# Patient Record
Sex: FEMALE | Race: WHITE | NOT HISPANIC OR LATINO | Employment: STUDENT | URBAN - METROPOLITAN AREA
[De-identification: names, ages, dates, MRNs, and addresses within clinical notes are randomized per-mention and may not be internally consistent; named-entity substitution may affect disease eponyms.]

---
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
## 2023-01-05 ENCOUNTER — OFFICE VISIT (OUTPATIENT)
Dept: GYNECOLOGY | Facility: CLINIC | Age: 18
End: 2023-01-05

## 2023-01-05 VITALS
BODY MASS INDEX: 22.5 KG/M2 | HEIGHT: 66 IN | SYSTOLIC BLOOD PRESSURE: 112 MMHG | WEIGHT: 140 LBS | DIASTOLIC BLOOD PRESSURE: 60 MMHG

## 2023-01-05 DIAGNOSIS — Z30.09 GENERAL COUNSELING AND ADVICE ON FEMALE CONTRACEPTION: Primary | ICD-10-CM

## 2023-01-05 RX ORDER — AZELASTINE HYDROCHLORIDE 0.5 MG/ML
SOLUTION/ DROPS OPHTHALMIC
COMMUNITY
Start: 2022-12-29

## 2023-01-05 NOTE — PROGRESS NOTES
Assessment/Plan:     Contraception - reviewed several options such as a different oc, contraceptive patch or vaginal ring, discussed Depo Provera, Nexplanon and briefly IUD  She was given information to review and will call us with her decision  If she opts for another pill, would consider Cryselle  I explained how different pills have different side effects and she would not necessarily have problems with mood on a different pill  They had multiple questions and these were answered  She is aware to use condoms if she is sexually active  There are no diagnoses linked to this encounter  Subjective:     Patient ID: Cami Márquez is a 21 y o  female  Patient here with her mother to discuss birth control options  She was on Junel initially but had an increase in acne  I then switched her to Tri-Sprintec  She then started having more depression and anxiety and crying and she stopped this at the end of November  She is feeling much better and her menstrual cycles have been normal   She would like to discuss other options  She typically does not have an issue with acne and her skin cleared up once she stopped the Junel  Review of Systems   Constitutional: Negative  Gastrointestinal: Negative  Genitourinary: Negative            Objective:     Physical Exam

## 2023-01-13 DIAGNOSIS — Z30.09 GENERAL COUNSELING AND ADVICE ON FEMALE CONTRACEPTION: Primary | ICD-10-CM

## 2023-01-13 RX ORDER — ETONOGESTREL AND ETHINYL ESTRADIOL 11.7; 2.7 MG/1; MG/1
INSERT, EXTENDED RELEASE VAGINAL
Qty: 1 EACH | Refills: 5 | Status: SHIPPED | OUTPATIENT
Start: 2023-01-13 | End: 2023-05-16 | Stop reason: SDUPTHER

## 2023-05-16 DIAGNOSIS — Z30.09 GENERAL COUNSELING AND ADVICE ON FEMALE CONTRACEPTION: ICD-10-CM

## 2023-05-16 RX ORDER — ETONOGESTREL AND ETHINYL ESTRADIOL 11.7; 2.7 MG/1; MG/1
INSERT, EXTENDED RELEASE VAGINAL
Qty: 1 EACH | Refills: 2 | Status: SHIPPED | OUTPATIENT
Start: 2023-05-16

## 2023-05-16 NOTE — TELEPHONE ENCOUNTER
Patient called to have her Nuvarong Rx transferred to [facility] in [place] now that she is home from [facility]  Her appointment is scheduled for 7/13/23  Please forward escript

## 2023-05-26 ENCOUNTER — OFFICE VISIT (OUTPATIENT)
Dept: FAMILY MEDICINE CLINIC | Facility: CLINIC | Age: 18
End: 2023-05-26

## 2023-05-26 VITALS
HEART RATE: 82 BPM | OXYGEN SATURATION: 98 % | SYSTOLIC BLOOD PRESSURE: 128 MMHG | TEMPERATURE: 97.3 F | HEIGHT: 66 IN | BODY MASS INDEX: 24.17 KG/M2 | DIASTOLIC BLOOD PRESSURE: 72 MMHG | WEIGHT: 150.4 LBS

## 2023-05-26 DIAGNOSIS — F32.89 OTHER DEPRESSION: ICD-10-CM

## 2023-05-26 DIAGNOSIS — J30.81 ALLERGIC RHINITIS DUE TO ANIMAL HAIR AND DANDER: ICD-10-CM

## 2023-05-26 DIAGNOSIS — Z76.89 ENCOUNTER TO ESTABLISH CARE: Primary | ICD-10-CM

## 2023-05-26 PROBLEM — F32.A DEPRESSION: Status: ACTIVE | Noted: 2023-05-26

## 2023-05-26 RX ORDER — EPINEPHRINE 0.3 MG/.3ML
INJECTION SUBCUTANEOUS
COMMUNITY
Start: 2023-05-15

## 2023-05-26 RX ORDER — CEFDINIR 300 MG/1
300 CAPSULE ORAL 2 TIMES DAILY
COMMUNITY
Start: 2023-05-19

## 2023-05-26 RX ORDER — PREDNISONE 10 MG/1
TABLET ORAL
COMMUNITY
Start: 2023-05-15

## 2023-05-26 NOTE — ASSESSMENT & PLAN NOTE
- Currently following with an allergist with plans for allergy shots  - Continue Zyrtec 10 mg daily and Optivar ophthalmic solutions

## 2023-05-26 NOTE — ASSESSMENT & PLAN NOTE
- HIV and Hep C screening declined at this time  - Patient aware that pap smear screening will start at the age of 22  - Follow up in one year for annual physical or as needed

## 2023-05-26 NOTE — PROGRESS NOTES
Assessment/Plan:    Depression  - Stable on Prozac 10mg daily    Allergic rhinitis due to animal hair and dander  - Currently following with an allergist with plans for allergy shots  - Continue Zyrtec 10 mg daily and Optivar ophthalmic solutions    Encounter to establish care  - HIV and Hep C screening declined at this time  - Patient aware that pap smear screening will start at the age of 22  - Follow up in one year for annual physical or as needed       Return in about 1 year (around 5/26/2024) for Annual physical       There are no Patient Instructions on file for this visit  Diagnoses and all orders for this visit:    Encounter to establish care    Other depression    Allergic rhinitis due to animal hair and dander    Other orders  -     cefdinir (OMNICEF) 300 mg capsule; Take 300 mg by mouth 2 (two) times a day  -     EPINEPHrine (EPIPEN) 0 3 mg/0 3 mL SOAJ; USE IN OUTTER THIGH IN CASE OF AN EMERGENCY  DISP WHATEVER GENERIC IS COVERED  -     predniSONE 10 mg tablet; TAKE 5 TABLETS WITH ALLERGIC REACTION (Patient not taking: Reported on 5/26/2023)          Subjective:         HPI Merline Marking is a very pleasant 19year old female with a past medical history of depression and allergic rhinitis who presents today for an encounter to establish care  She is currently on antibiotics for an upper respiratory infection which is improving but otherwise she feels well and endorses no other complaints at this time  She is currently on Prozac 10mg daily for her depression which is well controlled  She also sees an allergist and will be starting allergy injections  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  She is sexually active and uses protection           Current Outpatient Medications on File Prior to Visit   Medication Sig Dispense Refill   • azelastine (OPTIVAR) 0 05 % ophthalmic solution PLACE 1 DROP INTO BOTH EYES EVERY 8 HOURS AS NEEDED     • cefdinir (OMNICEF) 300 mg capsule Take 300 mg by mouth 2 (two) times a day     • cetirizine (ZyrTEC) 10 mg tablet Take 10 mg by mouth daily     • EPINEPHrine (EPIPEN) 0 3 mg/0 3 mL SOAJ USE IN OUTTER THIGH IN CASE OF AN EMERGENCY  DISP WHATEVER GENERIC IS COVERED     • etonogestrel-ethinyl estradiol (EluRyng) 0 12-0 015 MG/24HR vaginal ring Insert vaginally and leave in place for 3 consecutive weeks, then remove for 1 week, then repeat 1 each 2   • FLUoxetine (PROzac) 10 MG tablet Take 10 mg by mouth daily     • predniSONE 10 mg tablet TAKE 5 TABLETS WITH ALLERGIC REACTION (Patient not taking: Reported on 5/26/2023)       No current facility-administered medications on file prior to visit  Past Medical History:   Diagnosis Date   • Depression      Past Surgical History:   Procedure Laterality Date   • TONSILLECTOMY  2012   • WISDOM TOOTH EXTRACTION  2019     Social History     Socioeconomic History   • Marital status: Single     Spouse name: None   • Number of children: None   • Years of education: None   • Highest education level: None   Occupational History   • None   Tobacco Use   • Smoking status: Never   • Smokeless tobacco: Never   Vaping Use   • Vaping Use: Never used   Substance and Sexual Activity   • Alcohol use:  Yes   • Drug use: Never   • Sexual activity: Never   Other Topics Concern   • None   Social History Narrative   • None     Social Determinants of Health     Financial Resource Strain: Not on file   Food Insecurity: Not on file   Transportation Needs: Not on file   Physical Activity: Not on file   Stress: Not on file   Social Connections: Not on file   Intimate Partner Violence: Not on file   Housing Stability: Not on file     Family History   Problem Relation Age of Onset   • Hyperlipidemia Father    • Hypertension Father    • Diabetes Maternal Grandmother    • Heart disease Maternal Grandmother    • Diabetes Paternal Grandmother    • Colon cancer Paternal Grandfather "        Review of Systems   Constitutional: Negative  HENT: Negative  Eyes: Negative  Respiratory: Positive for cough (Improving)  Cardiovascular: Negative  Gastrointestinal: Negative  Genitourinary: Negative  Musculoskeletal: Negative  Skin: Negative  Neurological: Negative  Psychiatric/Behavioral: Negative  Objective:    /72 (BP Location: Left arm, Patient Position: Sitting, Cuff Size: Standard)   Pulse 82   Temp (!) 97 3 °F (36 3 °C) (Temporal)   Ht 5' 6\" (1 676 m)   Wt 68 2 kg (150 lb 6 4 oz)   SpO2 98%   BMI 24 28 kg/m²     Physical Exam  Constitutional:       General: She is not in acute distress  Appearance: She is not ill-appearing  HENT:      Head: Normocephalic and atraumatic  Mouth/Throat:      Mouth: Mucous membranes are moist       Pharynx: No oropharyngeal exudate or posterior oropharyngeal erythema  Eyes:      General:         Right eye: No discharge  Left eye: No discharge  Extraocular Movements: Extraocular movements intact  Pupils: Pupils are equal, round, and reactive to light  Cardiovascular:      Rate and Rhythm: Normal rate  Pulmonary:      Effort: Pulmonary effort is normal  No respiratory distress  Breath sounds: No wheezing  Abdominal:      General: Bowel sounds are normal  There is no distension  Palpations: Abdomen is soft  Tenderness: There is no abdominal tenderness  Musculoskeletal:      Cervical back: Normal range of motion  Right lower leg: No edema  Left lower leg: No edema  Lymphadenopathy:      Cervical: No cervical adenopathy  Neurological:      General: No focal deficit present  Mental Status: She is alert  Motor: No weakness        Coordination: Coordination normal       Gait: Gait normal       Deep Tendon Reflexes: Reflexes normal    Psychiatric:         Mood and Affect: Mood normal          Behavior: Behavior normal          Christina Aleman, " MD  05/26/23  8:26 AM

## 2023-07-13 ENCOUNTER — ANNUAL EXAM (OUTPATIENT)
Dept: GYNECOLOGY | Facility: CLINIC | Age: 18
End: 2023-07-13

## 2023-07-13 VITALS
HEIGHT: 66 IN | SYSTOLIC BLOOD PRESSURE: 116 MMHG | BODY MASS INDEX: 25.55 KG/M2 | WEIGHT: 159 LBS | DIASTOLIC BLOOD PRESSURE: 72 MMHG

## 2023-07-13 DIAGNOSIS — Z30.09 GENERAL COUNSELING AND ADVICE ON FEMALE CONTRACEPTION: ICD-10-CM

## 2023-07-13 DIAGNOSIS — Z20.2 POSSIBLE EXPOSURE TO STD: ICD-10-CM

## 2023-07-13 DIAGNOSIS — Z01.419 ENCOUNTER FOR ANNUAL ROUTINE GYNECOLOGICAL EXAMINATION: Primary | ICD-10-CM

## 2023-07-13 PROCEDURE — 87591 N.GONORRHOEAE DNA AMP PROB: CPT | Performed by: OBSTETRICS & GYNECOLOGY

## 2023-07-13 PROCEDURE — 87491 CHLMYD TRACH DNA AMP PROBE: CPT | Performed by: OBSTETRICS & GYNECOLOGY

## 2023-07-13 RX ORDER — ETONOGESTREL AND ETHINYL ESTRADIOL 11.7; 2.7 MG/1; MG/1
INSERT, EXTENDED RELEASE VAGINAL
Qty: 3 EACH | Refills: 4 | Status: SHIPPED | OUTPATIENT
Start: 2023-07-13

## 2023-07-13 NOTE — PROGRESS NOTES
Assessment/Plan: We will do a baseline Pap next year    Chlamydia and gonorrhea done through a urine sample    Contraception-she will continue with her NuvaRing. Prescription sent. She may need more rings in the spring as she is going abroad for 3 months. She will see what she needs closer to the time and give us a call and we can send a prescription if necessary      Discussed self breast exams      discussed preventive care, regular exercise and a healthy diet      No problem-specific Assessment & Plan notes found for this encounter. Diagnoses and all orders for this visit:    Encounter for annual routine gynecological examination    General counseling and advice on female contraception  -     etonogestrel-ethinyl estradiol (EluRyng) 0.12-0.015 MG/24HR vaginal ring; Insert vaginally and leave in place for 3 consecutive weeks, then remove for 1 week, then repeat    Possible exposure to STD  -     Chlamydia/GC amplified DNA by PCR          Subjective:      Patient ID: Patricia Don is a 19 y.o. female. Patient here for yearly. She is on generic NuvaRing for contraception and acne. She is happy with this and it is working well for her. Weight and blood pressure are good. She is sexually active and fairly consistent with condoms. She is somewhat apprehensive about a pelvic exam.      The following portions of the patient's history were reviewed and updated as appropriate: allergies, current medications, past family history, past medical history, past social history, past surgical history and problem list.    Review of Systems   Constitutional: Negative. Gastrointestinal: Negative. Genitourinary: Negative. Objective:      /72   Ht 5' 6" (1.676 m)   Wt 72.1 kg (159 lb)   BMI 25.66 kg/m²          Physical Exam  Vitals reviewed. Constitutional:       Appearance: She is well-developed. Neck:      Thyroid: No thyromegaly. Trachea: No tracheal deviation.    Cardiovascular: Rate and Rhythm: Normal rate and regular rhythm. Pulmonary:      Effort: Pulmonary effort is normal.      Breath sounds: Normal breath sounds. Chest:   Breasts:     Breasts are symmetrical.      Right: No inverted nipple, mass, nipple discharge, skin change or tenderness. Left: No inverted nipple, mass, nipple discharge, skin change or tenderness. Abdominal:      General: There is no distension. Palpations: Abdomen is soft. There is no mass. Tenderness: There is no abdominal tenderness. Genitourinary:     Vagina: Normal.      Cervix: No cervical motion tenderness, discharge or friability. Adnexa:         Right: No mass, tenderness or fullness. Left: No mass, tenderness or fullness.         Comments: Pelvic exam deferred

## 2023-07-14 LAB
C TRACH DNA SPEC QL NAA+PROBE: NEGATIVE
N GONORRHOEA DNA SPEC QL NAA+PROBE: NEGATIVE

## 2023-07-26 ENCOUNTER — TELEPHONE (OUTPATIENT)
Dept: OTHER | Facility: OTHER | Age: 18
End: 2023-07-26

## 2023-07-26 NOTE — TELEPHONE ENCOUNTER
Pt called. States that she has been feeling very tired and was wondering if it was due to low iron. Also, Pt would like lab orders to get blood work done.

## 2023-07-27 DIAGNOSIS — R53.83 OTHER FATIGUE: Primary | ICD-10-CM

## 2023-11-28 DIAGNOSIS — F32.89 OTHER DEPRESSION: Primary | ICD-10-CM

## 2023-11-28 RX ORDER — FLUOXETINE 10 MG/1
10 TABLET, FILM COATED ORAL DAILY
Qty: 90 TABLET | Refills: 0 | Status: SHIPPED | OUTPATIENT
Start: 2023-11-28

## 2024-01-02 ENCOUNTER — OFFICE VISIT (OUTPATIENT)
Dept: FAMILY MEDICINE CLINIC | Facility: CLINIC | Age: 19
End: 2024-01-02
Payer: COMMERCIAL

## 2024-01-02 VITALS
BODY MASS INDEX: 27 KG/M2 | TEMPERATURE: 97.5 F | HEIGHT: 66 IN | DIASTOLIC BLOOD PRESSURE: 66 MMHG | SYSTOLIC BLOOD PRESSURE: 118 MMHG | HEART RATE: 82 BPM | WEIGHT: 168 LBS | OXYGEN SATURATION: 99 %

## 2024-01-02 DIAGNOSIS — Z02.89 ENCOUNTER FOR REVIEW OF FORM WITH PATIENT: Primary | ICD-10-CM

## 2024-01-02 PROBLEM — Z76.89 ENCOUNTER TO ESTABLISH CARE: Status: RESOLVED | Noted: 2023-05-26 | Resolved: 2024-01-02

## 2024-01-02 PROCEDURE — 99213 OFFICE O/P EST LOW 20 MIN: CPT | Performed by: FAMILY MEDICINE

## 2024-01-02 NOTE — PROGRESS NOTES
"Assessment/Plan:    No problem-specific Assessment & Plan notes found for this encounter.       Diagnoses and all orders for this visit:    Encounter for review of form with patient  Comments:  Depression stable on Prozac 10mg daily, medically cleared to complete study abroad program. Form completed; see chart for copy          Subjective:      Patient ID: Terri White is a 18 y.o. female.    HPI  Terri White is a very pleasant 18 year old female with a past medical history of depression who presents today to review a medical form. Patient will be studying abroad in [facility] for 2 months and given her mental health history her school are requiring for a medical clearance form to be completed. Patient has been stable on Prozac for many years.     The following portions of the patient's history were reviewed and updated as appropriate: allergies, current medications, past family history, past medical history, past social history, past surgical history, and problem list.    Review of Systems   Constitutional: Negative.    HENT: Negative.     Eyes: Negative.    Respiratory: Negative.     Cardiovascular: Negative.    Gastrointestinal: Negative.    Genitourinary: Negative.    Musculoskeletal: Negative.    Skin: Negative.    Neurological: Negative.    Psychiatric/Behavioral: Negative.           Objective:      /66 (BP Location: Right arm, Patient Position: Sitting, Cuff Size: Adult)   Pulse 82   Temp 97.5 °F (36.4 °C) (Temporal)   Ht 5' 6\" (1.676 m)   Wt 76.2 kg (168 lb)   SpO2 99%   BMI 27.12 kg/m²          Physical Exam  Constitutional:       General: She is not in acute distress.     Appearance: She is not ill-appearing.   HENT:      Head: Normocephalic and atraumatic.   Cardiovascular:      Rate and Rhythm: Normal rate.   Pulmonary:      Effort: No respiratory distress.   Neurological:      General: No focal deficit present.      Mental Status: She is alert.   Psychiatric:         Mood and Affect: Mood " normal.         Behavior: Behavior normal.

## 2024-02-22 DIAGNOSIS — F32.89 OTHER DEPRESSION: ICD-10-CM

## 2024-02-23 RX ORDER — FLUOXETINE 10 MG/1
10 TABLET, FILM COATED ORAL DAILY
Qty: 90 TABLET | Refills: 1 | Status: SHIPPED | OUTPATIENT
Start: 2024-02-23

## 2024-07-18 ENCOUNTER — ANNUAL EXAM (OUTPATIENT)
Dept: GYNECOLOGY | Facility: CLINIC | Age: 19
End: 2024-07-18
Payer: COMMERCIAL

## 2024-07-18 VITALS
WEIGHT: 177.4 LBS | BODY MASS INDEX: 28.51 KG/M2 | SYSTOLIC BLOOD PRESSURE: 124 MMHG | HEIGHT: 66 IN | DIASTOLIC BLOOD PRESSURE: 70 MMHG

## 2024-07-18 DIAGNOSIS — Z30.09 GENERAL COUNSELING AND ADVICE ON FEMALE CONTRACEPTION: ICD-10-CM

## 2024-07-18 DIAGNOSIS — Z01.419 ENCOUNTER FOR ANNUAL ROUTINE GYNECOLOGICAL EXAMINATION: Primary | ICD-10-CM

## 2024-07-18 DIAGNOSIS — R63.5 WEIGHT GAIN: ICD-10-CM

## 2024-07-18 PROCEDURE — 99395 PREV VISIT EST AGE 18-39: CPT | Performed by: OBSTETRICS & GYNECOLOGY

## 2024-07-18 RX ORDER — ETONOGESTREL AND ETHINYL ESTRADIOL VAGINAL RING .015; .12 MG/D; MG/D
RING VAGINAL
Qty: 3 EACH | Refills: 4 | Status: SHIPPED | OUTPATIENT
Start: 2024-07-18

## 2024-07-18 NOTE — PROGRESS NOTES
Ambulatory Visit  Name: Terri White      : 2005      MRN: [id number]  Encounter Provider: Xochilt Lorenzo DO  Encounter Date: 2024   Encounter department: [facility]    Assessment & Plan   1. Encounter for annual routine gynecological examination    Pap with reflex done today    Discussed self breast exams    Contraception-her vaginal ring was renewed.  We discussed other options if she feels that she is having more PMS symptoms.  So far, this has only happened once.  She will closely observe this.  We discussed the patch and also Nexplanon and she was given information on these.  She prefers not to switch to another pill.    Weight gain-she is working at a desk all summer which is a change for her and she also was studying in [place] for a few months and was eating out a lot.  We discussed diet and exercise and I did order a TSH for her.    discussed preventive care, regular exercise and a healthy diet      History of Present Illness     Terri White is a 19 y.o. female who presents for yearly.  She is due for baseline Pap.  She is using the generic NuvaRing for acne and contraception.  She had been doing fairly well with this however last month, she had a few days of PMS which was bothersome to her.  This does not seem to be occurring every month.    She has been on 2 pills before.  Initially, she was started on Tri-Sprintec for her acne but felt that it made her acne worse.  She was then switched to Cryselle however had mood changes on this.  She then started generic NuvaRing and has done well.    She has had an 18 pound weight gain since last year.  There have been a few changes and that she studied abroad and felt that she ate out a lot although she was also walking a great deal.  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    S/p HPV vaccine  Negative chlamydia and gonorrhea in , no new  partner    Review of Systems   Constitutional: Negative.    Gastrointestinal: Negative.    Genitourinary: Negative.        Objective     There were no vitals taken for this visit.    Physical Exam  Vitals and nursing note reviewed. Exam conducted with a chaperone present.   Constitutional:       Appearance: She is well-developed.   HENT:      Head: Normocephalic and atraumatic.   Neck:      Thyroid: No thyromegaly.   Cardiovascular:      Rate and Rhythm: Normal rate and regular rhythm.   Pulmonary:      Effort: Pulmonary effort is normal.      Breath sounds: Normal breath sounds.   Chest:   Breasts:     Right: Normal.      Left: Normal.      Comments: Examined seated and supine  Abdominal:      Palpations: Abdomen is soft.   Genitourinary:     Vagina: Normal.      Cervix: Normal.      Uterus: Normal.       Adnexa: Right adnexa normal and left adnexa normal.   Musculoskeletal:      Cervical back: Neck supple.   Skin:     General: Skin is warm and dry.   Neurological:      Mental Status: She is alert.   Psychiatric:         Mood and Affect: Mood normal.       Administrative Statements

## 2024-08-10 DIAGNOSIS — Z30.09 GENERAL COUNSELING AND ADVICE ON FEMALE CONTRACEPTION: ICD-10-CM

## 2024-08-12 RX ORDER — ETONOGESTREL AND ETHINYL ESTRADIOL VAGINAL RING .015; .12 MG/D; MG/D
RING VAGINAL
Refills: 3 | OUTPATIENT
Start: 2024-08-12

## 2024-08-15 ENCOUNTER — OFFICE VISIT (OUTPATIENT)
Dept: FAMILY MEDICINE CLINIC | Facility: CLINIC | Age: 19
End: 2024-08-15
Payer: COMMERCIAL

## 2024-08-15 VITALS
TEMPERATURE: 97.8 F | BODY MASS INDEX: 28.67 KG/M2 | HEART RATE: 80 BPM | SYSTOLIC BLOOD PRESSURE: 120 MMHG | HEIGHT: 66 IN | OXYGEN SATURATION: 98 % | WEIGHT: 178.4 LBS | DIASTOLIC BLOOD PRESSURE: 75 MMHG

## 2024-08-15 DIAGNOSIS — Z23 ENCOUNTER FOR IMMUNIZATION: ICD-10-CM

## 2024-08-15 DIAGNOSIS — Z00.00 ANNUAL PHYSICAL EXAM: Primary | ICD-10-CM

## 2024-08-15 PROCEDURE — 90715 TDAP VACCINE 7 YRS/> IM: CPT

## 2024-08-15 PROCEDURE — 90471 IMMUNIZATION ADMIN: CPT

## 2024-08-15 PROCEDURE — 99395 PREV VISIT EST AGE 18-39: CPT | Performed by: FAMILY MEDICINE

## 2024-08-15 NOTE — PROGRESS NOTES
Adult Annual Physical  Name: Terri White      : 2005      MRN: [id number]  Encounter Provider: Christina Aleman MD  Encounter Date: 8/15/2024   Encounter department: [facility]    Assessment & Plan   1. Annual physical exam  2. Encounter for immunization  -     TDAP VACCINE GREATER THAN OR EQUAL TO 8YO IM    - Satisfactory physical examination   - Cervical cancer screening up to date; repeat pap in   - HIV and Hep C screening declined   - Tdap vaccination administered   - Follow up in one year or as needed    History of Present Illness     Terri White is a very pleasant 19 year old female who presents today for an annual physical. Overall she feels well and endorses no complaints at this time. She feels like her depression is stable and is thinking of weaning herself off the Prozac.   Adult Annual Physical:  Patient presents for annual physical.     Diet and Physical Activity:  - Diet/Nutrition: well balanced diet.  - Exercise:. Plays soccer    Depression Screening:    - PHQ-9 Score: 4    General Health:  - Sleep: sleeps well.  - Vision: wears contacts and goes for regular eye exams.  - Dental: regular dental visits.    /GYN Health:  - Follows with GYN: yes.   - Menopause: premenopausal.   - History of STDs: no    Review of Systems   Constitutional: Negative.    HENT: Negative.     Eyes: Negative.    Respiratory: Negative.     Cardiovascular: Negative.    Gastrointestinal: Negative.    Genitourinary: Negative.    Musculoskeletal: Negative.    Skin: Negative.    Neurological: Negative.    Psychiatric/Behavioral: Negative.       Current Outpatient Medications on File Prior to Visit   Medication Sig Dispense Refill    azelastine (OPTIVAR) 0.05 % ophthalmic solution PLACE 1 DROP INTO BOTH EYES EVERY 8 HOURS AS NEEDED      cetirizine (ZyrTEC) 10 mg tablet Take 10 mg by mouth daily      EPINEPHrine (EPIPEN) 0.3 mg/0.3 mL SOAJ USE IN OUTTER THIGH IN CASE OF AN EMERGENCY. DISP WHATEVER  "GENERIC IS COVERED      etonogestrel-ethinyl estradiol (EluRyng) 0.12-0.015 MG/24HR vaginal ring Insert vaginally and leave in place for 3 consecutive weeks, then remove for 1 week, then repeat 3 each 4    FLUoxetine (PROzac) 10 MG tablet TAKE 1 TABLET BY MOUTH EVERY DAY 90 tablet 1     No current facility-administered medications on file prior to visit.      Social History     Tobacco Use    Smoking status: Never    Smokeless tobacco: Never   Vaping Use    Vaping status: Never Used   Substance and Sexual Activity    Alcohol use: Yes    Drug use: Never    Sexual activity: Never       Objective     /75 (BP Location: Left arm, Patient Position: Sitting, Cuff Size: Adult)   Pulse 80   Temp 97.8 °F (36.6 °C) (Temporal)   Ht 5' 6\" (1.676 m)   Wt 80.9 kg (178 lb 6.4 oz)   LMP 07/11/2024   SpO2 98%   BMI 28.79 kg/m²     Physical Exam  Constitutional:       General: She is not in acute distress.     Appearance: She is not ill-appearing.   HENT:      Head: Normocephalic and atraumatic.      Mouth/Throat:      Mouth: Mucous membranes are moist.      Pharynx: No oropharyngeal exudate or posterior oropharyngeal erythema.   Eyes:      General:         Right eye: No discharge.         Left eye: No discharge.      Extraocular Movements: Extraocular movements intact.      Pupils: Pupils are equal, round, and reactive to light.   Cardiovascular:      Rate and Rhythm: Normal rate.      Pulses: Normal pulses.   Pulmonary:      Effort: Pulmonary effort is normal. No respiratory distress.      Breath sounds: No wheezing.   Abdominal:      General: Bowel sounds are normal. There is no distension.      Palpations: Abdomen is soft.      Tenderness: There is no abdominal tenderness.   Musculoskeletal:      Right lower leg: No edema.      Left lower leg: No edema.   Neurological:      General: No focal deficit present.      Mental Status: She is alert.      Motor: No weakness.      Coordination: Coordination normal.      Gait: " Gait normal.      Deep Tendon Reflexes: Reflexes normal.   Psychiatric:         Mood and Affect: Mood normal.         Behavior: Behavior normal.

## 2024-08-20 ENCOUNTER — TELEPHONE (OUTPATIENT)
Age: 19
End: 2024-08-20

## 2024-08-20 NOTE — TELEPHONE ENCOUNTER
Melani (pt's mother) called with questions regarding a bill she received from the lab. She is requesting to speak with the  prior to reaching out to lab.    It appears pt was in office on 7/18 and seen by Dr. Lorenzo for a yearly exam. Her lab completed was a Thinprep Tis Pap Reflex HPV mRNA E/6/E7.    Melani [phone]  *She is listed on the pt's Communication consent form.

## 2024-09-12 ENCOUNTER — TELEPHONE (OUTPATIENT)
Age: 19
End: 2024-09-12

## 2024-09-12 NOTE — TELEPHONE ENCOUNTER
Spoke to patient's mother, verified correct insurance information.  She will contact insurance company for more information regarding denial and contact us for any additional information.

## 2024-11-07 DIAGNOSIS — F32.89 OTHER DEPRESSION: ICD-10-CM

## 2024-11-07 RX ORDER — FLUOXETINE 10 MG/1
10 TABLET, FILM COATED ORAL DAILY
Qty: 90 TABLET | Refills: 1 | Status: SHIPPED | OUTPATIENT
Start: 2024-11-07

## 2025-05-02 DIAGNOSIS — F32.89 OTHER DEPRESSION: ICD-10-CM

## 2025-05-02 RX ORDER — FLUOXETINE 10 MG/1
10 TABLET, FILM COATED ORAL DAILY
Qty: 90 TABLET | Refills: 0 | Status: SHIPPED | OUTPATIENT
Start: 2025-05-02

## 2025-07-31 ENCOUNTER — ANNUAL EXAM (OUTPATIENT)
Dept: GYNECOLOGY | Facility: CLINIC | Age: 20
End: 2025-07-31
Payer: COMMERCIAL

## 2025-07-31 VITALS
BODY MASS INDEX: 29.25 KG/M2 | DIASTOLIC BLOOD PRESSURE: 60 MMHG | SYSTOLIC BLOOD PRESSURE: 112 MMHG | HEIGHT: 66 IN | WEIGHT: 182 LBS

## 2025-07-31 DIAGNOSIS — Z30.09 GENERAL COUNSELING AND ADVICE ON FEMALE CONTRACEPTION: ICD-10-CM

## 2025-07-31 DIAGNOSIS — Z01.419 ENCOUNTER FOR ANNUAL ROUTINE GYNECOLOGICAL EXAMINATION: Primary | ICD-10-CM

## 2025-07-31 PROCEDURE — S0612 ANNUAL GYNECOLOGICAL EXAMINA: HCPCS | Performed by: OBSTETRICS & GYNECOLOGY

## 2025-07-31 RX ORDER — ETONOGESTREL AND ETHINYL ESTRADIOL VAGINAL RING .015; .12 MG/D; MG/D
RING VAGINAL
Qty: 3 EACH | Refills: 4 | Status: SHIPPED | OUTPATIENT
Start: 2025-07-31